# Patient Record
Sex: MALE | Race: WHITE | NOT HISPANIC OR LATINO | URBAN - METROPOLITAN AREA
[De-identification: names, ages, dates, MRNs, and addresses within clinical notes are randomized per-mention and may not be internally consistent; named-entity substitution may affect disease eponyms.]

---

## 2019-03-05 NOTE — ASU PATIENT PROFILE, ADULT - NSTRANFUSIONPLAN_GEN_ALL_CORE_SIUH
Hpi Title: Evaluation of Skin Lesions
How Severe Are Your Spot(S)?: mild
Have Your Spot(S) Been Treated In The Past?: has not been treated
not applicable

## 2019-03-06 ENCOUNTER — OUTPATIENT (OUTPATIENT)
Dept: OUTPATIENT SERVICES | Facility: HOSPITAL | Age: 29
LOS: 1 days | Discharge: HOME | End: 2019-03-06

## 2019-03-06 VITALS
HEART RATE: 75 BPM | DIASTOLIC BLOOD PRESSURE: 68 MMHG | SYSTOLIC BLOOD PRESSURE: 113 MMHG | RESPIRATION RATE: 14 BRPM | OXYGEN SATURATION: 96 %

## 2019-03-06 VITALS
TEMPERATURE: 98 F | RESPIRATION RATE: 18 BRPM | OXYGEN SATURATION: 98 % | HEIGHT: 74 IN | WEIGHT: 214.95 LBS | HEART RATE: 73 BPM | SYSTOLIC BLOOD PRESSURE: 139 MMHG | DIASTOLIC BLOOD PRESSURE: 86 MMHG

## 2019-03-06 DIAGNOSIS — Z90.89 ACQUIRED ABSENCE OF OTHER ORGANS: Chronic | ICD-10-CM

## 2019-03-06 RX ORDER — OXYCODONE AND ACETAMINOPHEN 5; 325 MG/1; MG/1
1 TABLET ORAL ONCE
Qty: 0 | Refills: 0 | Status: DISCONTINUED | OUTPATIENT
Start: 2019-03-06 | End: 2019-03-06

## 2019-03-06 RX ORDER — HYDROMORPHONE HYDROCHLORIDE 2 MG/ML
0.5 INJECTION INTRAMUSCULAR; INTRAVENOUS; SUBCUTANEOUS
Qty: 0 | Refills: 0 | Status: DISCONTINUED | OUTPATIENT
Start: 2019-03-06 | End: 2019-03-06

## 2019-03-06 RX ORDER — ONDANSETRON 8 MG/1
4 TABLET, FILM COATED ORAL ONCE
Qty: 0 | Refills: 0 | Status: DISCONTINUED | OUTPATIENT
Start: 2019-03-06 | End: 2019-03-21

## 2019-03-06 RX ORDER — SODIUM CHLORIDE 9 MG/ML
1000 INJECTION, SOLUTION INTRAVENOUS
Qty: 0 | Refills: 0 | Status: DISCONTINUED | OUTPATIENT
Start: 2019-03-06 | End: 2019-03-21

## 2019-03-06 RX ADMIN — SODIUM CHLORIDE 100 MILLILITER(S): 9 INJECTION, SOLUTION INTRAVENOUS at 13:08

## 2019-03-06 RX ADMIN — HYDROMORPHONE HYDROCHLORIDE 0.5 MILLIGRAM(S): 2 INJECTION INTRAMUSCULAR; INTRAVENOUS; SUBCUTANEOUS at 13:46

## 2019-03-06 RX ADMIN — HYDROMORPHONE HYDROCHLORIDE 0.5 MILLIGRAM(S): 2 INJECTION INTRAMUSCULAR; INTRAVENOUS; SUBCUTANEOUS at 13:34

## 2019-03-06 RX ADMIN — OXYCODONE AND ACETAMINOPHEN 1 TABLET(S): 5; 325 TABLET ORAL at 14:18

## 2019-03-06 RX ADMIN — HYDROMORPHONE HYDROCHLORIDE 0.5 MILLIGRAM(S): 2 INJECTION INTRAMUSCULAR; INTRAVENOUS; SUBCUTANEOUS at 13:12

## 2019-03-06 RX ADMIN — HYDROMORPHONE HYDROCHLORIDE 0.5 MILLIGRAM(S): 2 INJECTION INTRAMUSCULAR; INTRAVENOUS; SUBCUTANEOUS at 13:22

## 2019-03-06 NOTE — PRE-ANESTHESIA EVALUATION ADULT - WEIGHT IN LBS
FFPS FINISHED. PATIENT VS STABLE WITH NO COMPLAINTS. IV INTACT. WILL CONTINUE
TO MONITOR. CALL LIGHT WITHINR EACH. [phone]

## 2019-03-06 NOTE — H&P PST ADULT - ATTENDING COMMENTS
r/b/a explained to patient again  explained risk of neuropraxia (LABC, radial, median, pin nerve)  consent reviewed  all questions answered

## 2019-03-06 NOTE — CHART NOTE - NSCHARTNOTEFT_GEN_A_CORE
PACU ANESTHESIA ADMISSION NOTE      Procedure:   Post op diagnosis:      ____  Intubated  TV:______       Rate: ______      FiO2: ______    __x__  Patent Airway    ___x_  Full return of protective reflexes    ___x_  Full recovery from anesthesia / back to baseline     Vitals:   T:  97.9         R:  18                BP:    113/69              Sat:    96%               P: 69      Mental Status:  _x___ Awake   _____ Alert   _____ Drowsy   _____ Sedated    Nausea/Vomiting:  __x__ NO  ______Yes,   See Post - Op Orders          Pain Scale (0-10):  _____    Treatment: __x__ None    ____ See Post - Op/PCA Orders    Post - Operative Fluids:   ____ Oral   x____ See Post - Op Orders    Plan: Discharge:   __x__Home       _____Floor     _____Critical Care    _____  Other:_________________    Comments:

## 2019-03-06 NOTE — ASU DISCHARGE PLAN (ADULT/PEDIATRIC). - SPECIAL INSTRUCTIONS
Post Operative Instructions for Elbow Surgery    Your Surgery Included  [x ] Distal biceps repair  	  Call our office ([phone]) immediately if you experience any of the following:  •	Excessive bleeding or pus like drainage at the incision site  •	Uncontrollable pain not relieved by pain medication  •	Excessive swelling or redness at the incision site  •	Fever above 101.5 degrees not controlled with Tylenol or Motrin  •	Shortness of Breath  •	Any foul odor or blistering from the surgery site    Pain Management: You were given one or more of the following medication prescriptions before leaving the hospital. Have the prescriptions filled at a pharmacy on your way home and follow the instructions on the bottles.   Regional Anesthesia Injections (Blocks): You may have been given a regional nerve block either before or after surgery. This may numb your shoulder for 24-36 hours    Diet: Eat a bland diet for the first day after surgery. Progress your diet as tolerated. Constipation may occur with Narcotic usage, contact our office if you are experiencing constipation.    Activity: After you arrive at home, spend most of the first 24 hours resting in bed, on the couch, or in a reclining chair. After the first 24 hours at home, slowly increase your activity level based on your symptoms.    Dressing Change: Stay in splint and dressing until follow up.    Showering: Keep operative extremity clean and dry.    Shoulder Sling: You may have been sent home with a sling / pillow attachment holding your arm away from your body. You may remove the sling when changing clothes or bathing. Make sure to wear the sling while sleeping unless instructed otherwise. You may remove for exercises.    Shoulder Exercises: You may do these exercises for 2-5 mins five times a day in order to help regain your range of motion.  [x ] Shoulder Shrugs: Shrug your shoulders up and down  [ ] Pendulums: Bend forward allowing your arm to hang down in front of you. Gently swing your arm side-to-side and front to back  [ ] Elbow range of motion: Straighten and bend your elbow  [x ] Scapula Retractions: Squeeze shoulder blades together while slightly pulling them down  [ ] Passive Abduction: Have family member lift your arm away from your body bringing your elbow to the level of your shoulder  [ ] Shoulder rotation: With your arm at your side, have a family member rotate your arm internally and externally  [ ] Pulley exercises: Put a towel over the top of a door and face the door, use your good arm to pull your arm up in front of you    Follow Up: As Scheduled

## 2019-03-06 NOTE — ASU PREOP CHECKLIST - WEIGHT IN KG
The following medication was given:     MEDICATION: Vitamin B12  1,000mcg  ROUTE: IM  SITE: Deltoid - Left  LOT #: [id number]  :  [employer]  EXPIRATION DATE:  9/2018  NDC#: 8756-7117-08    Aneta Dueñas MA       
97.5
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
## 2019-03-20 DIAGNOSIS — S46.112A STRAIN OF MUSCLE, FASCIA AND TENDON OF LONG HEAD OF BICEPS, LEFT ARM, INITIAL ENCOUNTER: ICD-10-CM

## 2019-08-21 ENCOUNTER — EMERGENCY (EMERGENCY)
Facility: HOSPITAL | Age: 29
LOS: 0 days | Discharge: HOME | End: 2019-08-21
Attending: EMERGENCY MEDICINE | Admitting: EMERGENCY MEDICINE
Payer: COMMERCIAL

## 2019-08-21 VITALS
WEIGHT: 225.09 LBS | TEMPERATURE: 98 F | OXYGEN SATURATION: 98 % | RESPIRATION RATE: 18 BRPM | HEART RATE: 76 BPM | SYSTOLIC BLOOD PRESSURE: 175 MMHG | DIASTOLIC BLOOD PRESSURE: 81 MMHG

## 2019-08-21 DIAGNOSIS — Z90.89 ACQUIRED ABSENCE OF OTHER ORGANS: Chronic | ICD-10-CM

## 2019-08-21 DIAGNOSIS — M54.5 LOW BACK PAIN: ICD-10-CM

## 2019-08-21 DIAGNOSIS — M62.838 OTHER MUSCLE SPASM: ICD-10-CM

## 2019-08-21 DIAGNOSIS — M79.669 PAIN IN UNSPECIFIED LOWER LEG: ICD-10-CM

## 2019-08-21 PROCEDURE — 99283 EMERGENCY DEPT VISIT LOW MDM: CPT

## 2019-08-21 RX ORDER — KETOROLAC TROMETHAMINE 30 MG/ML
15 SYRINGE (ML) INJECTION ONCE
Refills: 0 | Status: DISCONTINUED | OUTPATIENT
Start: 2019-08-21 | End: 2019-08-21

## 2019-08-21 RX ORDER — METHOCARBAMOL 500 MG/1
1500 TABLET, FILM COATED ORAL ONCE
Refills: 0 | Status: COMPLETED | OUTPATIENT
Start: 2019-08-21 | End: 2019-08-21

## 2019-08-21 RX ORDER — METHOCARBAMOL 500 MG/1
2 TABLET, FILM COATED ORAL
Qty: 40 | Refills: 0
Start: 2019-08-21 | End: 2019-08-25

## 2019-08-21 RX ADMIN — METHOCARBAMOL 1500 MILLIGRAM(S): 500 TABLET, FILM COATED ORAL at 22:42

## 2019-08-21 RX ADMIN — Medication 15 MILLIGRAM(S): at 22:42

## 2019-08-21 NOTE — ED PROVIDER NOTE - PHYSICAL EXAMINATION
PHYSICAL EXAM: I have reviewed current vital signs.  GENERAL: NAD, well-nourished; well-developed.  HEAD:  Normocephalic, atraumatic.  EYES: Conjunctiva and sclera clear.  ENT: MMM, no erythema/exudates.  NECK: Supple, full ROM, no midline TTP.  CHEST/LUNG: Clear to auscultation bilaterally; no wheezes, rales, or rhonchi.  HEART: Regular rate and rhythm, normal S1 and S2; no murmurs, rubs, or gallops.  ABDOMEN: Soft, nontender, nondistended.  EXTREMITIES:  2+ peripheral pulses; FROM of all extremities.  MSK:  No midline TTP, negative straight leg raise, atraumatic.  NEUROLOGY: A&O x 3. Motor 5/5. No focal neurological deficits.   SKIN: Warm and dry.

## 2019-08-21 NOTE — ED ADULT TRIAGE NOTE - CHIEF COMPLAINT QUOTE
c/o b/l hamstring pain shooting into calves after decompression therapy at chiropractor on Monday. States he originally went to chiropractor for sciatic nerve pain Patient seen running in ED from chair to room and watched patient try to climb onto stretcher.

## 2019-08-21 NOTE — ED PROVIDER NOTE - ATTENDING CONTRIBUTION TO CARE
27 yo M pmh of sciatica presents with bilateral hamstring pain left worse than right. States that last week he started to have lower back pain and decided to go to a chiropractor few days ago and had decompression done. States that he did well after but then started to have bilateral lower back pain radiating down the back of his legs. no numbness, tingling or weakness. no trauma, walking normally. no changes in urination or bowel habits.     CONSTITUTIONAL: Well-developed; well-nourished; in no acute distress.   SKIN: warm, dry  HEAD: Normocephalic; atraumatic.  EYES: PERRL, EOMI, no conjunctival erythema  ENT: No nasal discharge; airway clear.  NECK: Supple; non tender.  CARD: S1, S2 normal;  Regular rate and rhythm.   RESP: No wheezes, rales or rhonchi.  ABD: soft non tender, non distended, no rebound or guarding  EXT: Normal ROM.  no midline spinal tenderness. 5/5 strength in all 4 extremities. + LLE straight leg raise test.   LYMPH: No acute cervical adenopathy.  NEURO: Alert, oriented, grossly unremarkable. neurovascularly intact

## 2019-08-21 NOTE — ED ADULT NURSE NOTE - NSIMPLEMENTINTERV_GEN_ALL_ED
Implemented All Universal Safety Interventions:  [place] to call system. Call bell, personal items and telephone within reach. Instruct patient to call for assistance. Room bathroom lighting operational. Non-slip footwear when patient is off stretcher. Physically safe environment: no spills, clutter or unnecessary equipment. Stretcher in lowest position, wheels locked, appropriate side rails in place.

## 2019-08-21 NOTE — ED ADULT NURSE NOTE - CHIEF COMPLAINT QUOTE
c/o b/l hamstring pain shooting into calves after decompression therapy at chiropractor on Monday. States he originally went to chiropractor for sciatic nerve pain

## 2019-08-21 NOTE — ED PROVIDER NOTE - CLINICAL SUMMARY MEDICAL DECISION MAKING FREE TEXT BOX
Patient presents with bilateral hamstring pain. toradol and robaxin given with improvement in symptoms. Discharged with motrin and rehab follow up. Return precautions discussed.

## 2019-08-21 NOTE — ED PROVIDER NOTE - PROGRESS NOTE DETAILS
Will give muscle relaxant and Toradol, then reassess. Pain improved after meds. Strict return precaution signs/sxs given.

## 2019-08-21 NOTE — ED PROVIDER NOTE - NSFOLLOWUPINSTRUCTIONS_ED_ALL_ED_FT
Please follow up with your primary care physician in 1-2 days.       Muscle Cramps and Spasms  ImageMuscle cramps and spasms occur when a muscle or muscles tighten and you have no control over this tightening (involuntary muscle contraction). They are a common problem and can develop in any muscle. The most common place is in the calf muscles of the leg. Muscle cramps and muscle spasms are both involuntary muscle contractions, but there are some differences between the two:  Muscle cramps are painful. They come and go and may last a few seconds to 15 minutes. Muscle cramps are often more forceful and last longer than muscle spasms.  Muscle spasms may or may not be painful. They may also last just a few seconds or much longer.  Certain medical conditions, such as diabetes or Parkinson disease, can make it more likely to develop cramps or spasms. However, cramps or spasms are usually not caused by a serious underlying problem. Common causes include:  Overexertion.  Overuse from repetitive motions, or doing the same thing over and over.  Remaining in a certain position for a long period of time.  Improper preparation, form, or technique while playing a sport or doing an activity.  Dehydration.  Injury.  Side effects of some medicines.  Abnormally low levels of the salts and ions in your blood (electrolytes), especially potassium and calcium. This could happen if you are taking water pills (diuretics) or if you are pregnant.  In many cases, the cause of muscle cramps or spasms is unknown.    Follow these instructions at home:  Stay well hydrated. Drink enough fluid to keep your urine clear or pale yellow.  Try massaging, stretching, and relaxing the affected muscle.  If directed, apply heat to tight or tense muscles as often as told by your health care provider. Use the heat source that your health care provider recommends, such as a moist heat pack or a heating pad.  Place a towel between your skin and the heat source.  Leave the heat on for 20–30 minutes.  Remove the heat if your skin turns bright red. This is especially important if you are unable to feel pain, heat, or cold. You may have a greater risk of getting burned.  If directed, put ice on the affected area. This may help if you are sore or have pain after a cramp or spasm.  Put ice in a plastic bag.  Place a towel between your skin and the bag.  Leave the ice on for 20 minutes, 2–3 times a day.  Take over-the-counter and prescription medicines only as told by your health care provider.  Pay attention to any changes in your symptoms.  Contact a health care provider if:  Your cramps or spasms get more severe or happen more often.  Your cramps or spasms do not improve over time.  This information is not intended to replace advice given to you by your health care provider. Make sure you discuss any questions you have with your health care provider. Please follow up with your primary care physician in 1-2 days.       Muscle Cramps and Spasms  Muscle cramps and spasms occur when a muscle or muscles tighten and you have no control over this tightening (involuntary muscle contraction). They are a common problem and can develop in any muscle. The most common place is in the calf muscles of the leg. Muscle cramps and muscle spasms are both involuntary muscle contractions, but there are some differences between the two:  Muscle cramps are painful. They come and go and may last a few seconds to 15 minutes. Muscle cramps are often more forceful and last longer than muscle spasms.  Muscle spasms may or may not be painful. They may also last just a few seconds or much longer.  Certain medical conditions, such as diabetes or Parkinson disease, can make it more likely to develop cramps or spasms. However, cramps or spasms are usually not caused by a serious underlying problem. Common causes include:  Overexertion.  Overuse from repetitive motions, or doing the same thing over and over.  Remaining in a certain position for a long period of time.  Improper preparation, form, or technique while playing a sport or doing an activity.  Dehydration.  Injury.  Side effects of some medicines.  Abnormally low levels of the salts and ions in your blood (electrolytes), especially potassium and calcium. This could happen if you are taking water pills (diuretics) or if you are pregnant.  In many cases, the cause of muscle cramps or spasms is unknown.    Follow these instructions at home:  Stay well hydrated. Drink enough fluid to keep your urine clear or pale yellow.  Try massaging, stretching, and relaxing the affected muscle.  If directed, apply heat to tight or tense muscles as often as told by your health care provider. Use the heat source that your health care provider recommends, such as a moist heat pack or a heating pad.  Place a towel between your skin and the heat source.  Leave the heat on for 20–30 minutes.  Remove the heat if your skin turns bright red. This is especially important if you are unable to feel pain, heat, or cold. You may have a greater risk of getting burned.  If directed, put ice on the affected area. This may help if you are sore or have pain after a cramp or spasm.  Put ice in a plastic bag.  Place a towel between your skin and the bag.  Leave the ice on for 20 minutes, 2–3 times a day.  Take over-the-counter and prescription medicines only as told by your health care provider.  Pay attention to any changes in your symptoms.  Contact a health care provider if:  Your cramps or spasms get more severe or happen more often.  Your cramps or spasms do not improve over time.  This information is not intended to replace advice given to you by your health care provider. Make sure you discuss any questions you have with your health care provider.

## 2019-08-21 NOTE — ED PROVIDER NOTE - NS ED ROS FT
Constitutional:  No fevers or chills.  Eyes:  No visual changes, eye pain, or discharge.  ENT:  No hearing changes. No sore throat.  Neck:  No neck pain.  Cardiac:  No CP or edema.  Resp:  No cough or SOB.   GI:  No nausea, vomiting, diarrhea, or abdominal pain.  :  No dysuria, frequency, hematuria, or incontinence.  MSK:  No myalgias. +Low back pain/sciatica pain.  Neuro:  No headache, dizziness, or weakness.  Skin:  No skin rash.

## 2019-08-21 NOTE — ED PROVIDER NOTE - OBJECTIVE STATEMENT
29yo M with PMH of sciatica and herniated discs presenting with sciatica pain down both legs (greater on left), shooting pains, and mild low lumbar pain (now improved). Patient reports he developed low back pain on about 4-5 days ago, did do some lifting but nothing that was increased in heaviness. Saw chiropractor and had decompression therapy 3 days ago which improved the low back pain but is still having sharp shooting pains down legs. Denies any f/c, CP, SOB, abd pain, urinary sxs, direct injuries/trauma to area, or urinary/fecal incontinence. Has been taking 4-5 200mg Aleves every 8 to 10 hours, last took at noon today.

## 2019-08-21 NOTE — ED PROVIDER NOTE - NSFOLLOWUPCLINICS_GEN_ALL_ED_FT
[facility] ([facility])  Rehabilitation  [facility] [address] [place] [address]  Phone: [phone]  Fax:   Follow Up Time:

## 2019-08-24 ENCOUNTER — EMERGENCY (EMERGENCY)
Facility: HOSPITAL | Age: 29
LOS: 0 days | Discharge: HOME | End: 2019-08-24
Attending: EMERGENCY MEDICINE | Admitting: EMERGENCY MEDICINE
Payer: COMMERCIAL

## 2019-08-24 VITALS
TEMPERATURE: 97 F | OXYGEN SATURATION: 97 % | SYSTOLIC BLOOD PRESSURE: 139 MMHG | DIASTOLIC BLOOD PRESSURE: 85 MMHG | HEART RATE: 82 BPM | RESPIRATION RATE: 17 BRPM | WEIGHT: 225.09 LBS
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
## 2019-08-24 DIAGNOSIS — Z90.89 ACQUIRED ABSENCE OF OTHER ORGANS: Chronic | ICD-10-CM
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
## 2019-08-24 DIAGNOSIS — Z98.890 OTHER SPECIFIED POSTPROCEDURAL STATES: ICD-10-CM

## 2019-08-24 DIAGNOSIS — M79.669 PAIN IN UNSPECIFIED LOWER LEG: ICD-10-CM

## 2019-08-24 DIAGNOSIS — S76.312A STRAIN OF MUSCLE, FASCIA AND TENDON OF THE POSTERIOR MUSCLE GROUP AT THIGH LEVEL, LEFT THIGH, INITIAL ENCOUNTER: ICD-10-CM

## 2019-08-24 PROCEDURE — 99282 EMERGENCY DEPT VISIT SF MDM: CPT

## 2019-08-24 NOTE — ED PROVIDER NOTE - OBJECTIVE STATEMENT
28 y.o male w/ hx of chronic low back pain, sciatica presents to the ED for evaluation of left posterior thigh pain x 1 days.  3 days ago stood up quickly while he was in a rush, felt acute pain of left posterior thigh and low back. Was seen in ED 3 days ago for back pain, given muscle relaxers.  F/u with chiropractor, had decompression therapy with improvement of back pain. Since then pain of posterior thigh. Pain improved with cold and hot packs.  Seen by ortho yesterday and had negative xrays, given meloxicam with relief of pain.  Today posterior thigh pain worse prompting visit to the ED.  Worse w/ stretching hamstring, alleviated with rest, mild severity, no radiation of pain. Denies back pain, fever, chills, hx of IVDA, saddle anesthesia, bowel/bladder incontinence/ retention, weakness of lower extremities

## 2019-08-24 NOTE — ED PROVIDER NOTE - PHYSICAL EXAMINATION
CONST: Well appearing in NAD  EYES: Sclera and conjunctiva clear.  CARD: Normal S1 S2; Normal rate and rhythm  RESP: Equal BS B/L, No wheezes, rhonchi or rales. No distress  GI: Soft, non-tender, non-distended.  MS: no TTP of midline C/T/L tenderness, no lumbar paravertebral tenderness, Normal ROM in all extremities, no TTP of left posterior thigh, pain worse with straightening left leg, No edema of lower extremities, no calf pain, posterior tibialis pulses 2+ bilaterally  SKIN: Warm, dry, no acute rashes. Good turgor  NEURO: left leg NVI, Strength 5/5 with no sensory deficits. Steady gait

## 2019-08-24 NOTE — ED PROVIDER NOTE - ATTENDING CONTRIBUTION TO CARE
28 y M no PMH pw left posterior thigh pain, originated with a rapid movement going from leaning back in a chair with his legs up to seated. somewhat improved by cold and hot packs, chiropractic adjustment, and meloxicam rx outpt, worsened with stretch. no buttock pain or back pain. No trauma to suspect fracture, no recent back surgery, history of easy bleeding or bruising, chronic illness, steroids, IVDU, fevers, weakness, loss of urine or bowel control, numbness, or inability to ambulate.  Exam: NAD, NCAT, HEENT: mmm, EOMI, PERRLA, Neck: supple, nontender, nl ROM, Heart: RRR, no murmur, Lungs: BCTA, no signs of increased WOB, Abd: NTND, no guarding or rebound, no hernia palpated, no CVAT. MSK: left lateral hamstring, ttp, no bunching to suggest complete tear. Chest, back, and ext nontender, nl rom, no deformity, no edema. Neuro: A&Ox3, normal strength, nl sensation throughout, normal speech and gait  A/P: Likely hamstring pull, No sign of other pathology, pain control, home therapy.

## 2019-08-24 NOTE — ED PROVIDER NOTE - CARE PROVIDER_API CALL
Jori White (MD)  Orthopaedic Surgery  [address] [place] [address]  Phone: [phone]  Fax: [phone]  Follow Up Time: 1-3 Days

## 2019-08-24 NOTE — ED PROVIDER NOTE - CLINICAL SUMMARY MEDICAL DECISION MAKING FREE TEXT BOX
pw left posterior thigh pain, likely muscle strain. Patient to be discharged from ED. Any available test results were discussed with patient and/or family. Verbal instructions given, including instructions to return to ED immediately for any new, worsening, or concerning symptoms. Patient endorsed understanding. Written discharge instructions additionally given, including follow-up plan.

## 2019-08-24 NOTE — ED PROVIDER NOTE - NSFOLLOWUPINSTRUCTIONS_ED_ALL_ED_FT
Strain    A strain is a stretch or tear in one of the muscles in your body. This is caused by an injury to the area such as a twisting mechanism. Symptoms include pain, swelling, or bruising. Rest that area over the next several days and slowly resume activity when tolerated. Ice can help with swelling and pain.     SEEK IMMEDIATE MEDICAL CARE IF YOU HAVE ANY OF THE FOLLOWING SYMPTOMS: worsening pain, inability to move that body part, numbness or tingling.    Hamstring Strain  Image   A hamstring strain happens when the muscles in the back of the thighs (hamstring muscles) are overstretched or torn. The hamstring muscles are used in straightening the hips, bending the knees, and pulling back the legs. This injury is often called a pulled hamstring muscle. The tissue that connects the muscle to a bone (tendon) may also be affected.    The severity of a hamstring strain may be rated in degrees or grades. First-degree (or grade 1) strains have the least amount of muscle tearing and pain. Second-degree and third-degree (grade 2 and 3) strains have increasingly more tearing and pain.    What are the causes?  This condition is caused by a sudden, violent force being placed on the hamstring muscles, stretching them too far. This often happens during activities that involve running, jumping, kicking, or weight lifting.    What increases the risk?  Hamstring strains are especially common in athletes. The following factors may also make you more likely to develop this condition:  Having low strength, endurance, or flexibility of the hamstring muscles.  Doing high-impact physical activity or sports.  Having poor physical fitness.  Having a previous leg injury.  Having tired (fatigued) muscles.  What are the signs or symptoms?  Symptoms of this condition include:  Pain in the back of the thigh.  Swelling.  Bruising.  Muscle spasms.  Trouble moving the affected muscle because of pain.  For severe strains, you may feel popping or snapping in the back of your thigh when the injury occurs.    How is this diagnosed?  This condition is diagnosed based on your symptoms, your medical history, and a physical exam.    How is this treated?  Treatment for this condition usually involves:  Protecting, resting, icing, applying compression, and elevating the injured area (PRICE therapy).  Medicines. Your health care provider may recommend medicines to help reduce pain or inflammation.  Doing exercises to regain strength and flexibility in the muscles. Your health care provider will tell you when it is okay to begin exercising.  Follow these instructions at home:  PRICE therapy     ImageUse PRICE therapy to promote muscle healing during the first 2–3 days after your injury, or as told by your health care provider.  Protect the muscle from being injured again.  Rest your injury. This usually involves limiting your normal activities and not using the injured hamstring muscle. Talk with your health care provider about how you should limit your activities.  Apply ice to the injured area:  Put ice in a plastic bag.   Place a towel between your skin and the bag.   Leave the ice on for 20 minutes, 2–3 times a day. After the third day, switch to applying heat as told.  Put pressure (compression) on your injured hamstring by wrapping it with an elastic bandage. Be careful not to wrap it too tightly. That may interfere with blood circulation or may increase swelling.  Raise (elevate) your injured hamstring above the level of your heart as often as possible. When you are lying down, you can do this by putting a pillow under your thigh.  Activity     Begin exercising or stretching only as told by your health care provider.  Do not return to full activity level until your health care provider approves.  To help prevent muscle strains in the future, always warm up before exercising and stretch afterward.  General instructions     Take over-the-counter and prescription medicines only as told by your health care provider.  If directed, apply heat to the affected area as often as told by your health care provider. Use the heat source that your health care provider recommends, such as a moist heat pack or a heating pad.  Place a towel between your skin and the heat source.   Leave the heat on for 20–30 minutes.   Remove the heat if your skin turns bright red. This is especially important if you are unable to feel pain, heat, or cold. You may have a greater risk of getting burned.  Keep all follow-up visits as told by your health care provider. This is important.  Contact a health care provider if you have:  Increasing pain or swelling in the injured area.  Numbness, tingling, or a significant loss of strength in the injured area.  Get help right away if:  Your foot or your toes become cold or turn blue.  Summary  A hamstring strain happens when the muscles in the back of the thighs (hamstring muscles) are overstretched or torn.  This injury can be caused by a sudden, violent force being placed on the hamstring muscles, causing them to stretch too far.  Symptoms include pain, swelling, and muscle spasms in the injured area.  Treatment includes what is called PRICE therapy: protecting, resting, icing, applying compression, and elevating the injured area.  This information is not intended to replace advice given to you by your health care provider. Make sure you discuss any questions you have with your health care provider.    Follow up with your primary medical doctor in 1-2 days

## 2019-08-24 NOTE — ED PROVIDER NOTE - NS ED ROS FT
Constitutional: See HPI.  Eyes: No visual changes, eye pain or discharge.   ENMT: No hearing changes, pain, discharge or infections.   Cardiac: No SOB or edema. No chest pain with exertion.  Respiratory: No cough or respiratory distress.   GI: No nausea, vomiting, diarrhea or abdominal pain.  : No dysuria, frequency or burning. No Discharge  MS: + posterior thigh pain. No myalgia, muscle weakness, joint pain or back pain.  Neuro: No headache or weakness.   Skin: No skin rash.  Except as documented in the HPI, all other systems are negative.

## 2021-01-06 NOTE — ED ADULT NURSE NOTE - NS PRO PASSIVE SMOKE EXP
No
[facility]  [id number] [place] [address]  Phone: [phone]  Fax: [phone]  Follow Up Time: 1-3 Days

## 2021-02-11 NOTE — ASU PATIENT PROFILE, ADULT - URINARY CATHETER
no Detail Level: Zone Note Text (......Xxx Chief Complaint.): This diagnosis correlates with the Other (Free Text): Hold effudex for now Render Risk Assessment In Note?: no

## 2021-03-18 NOTE — ED PROVIDER NOTE - NS ED ATTENDING STATEMENT MOD
PRINCIPAL DISCHARGE DIAGNOSIS  Diagnosis: Frequent falls  Assessment and Plan of Treatment: Seen by Neurology.  Pt has hx of parkinsons.  Continue current medications.  Physical therapy recommending ROLA.      SECONDARY DISCHARGE DIAGNOSES  Diagnosis: Abrasions of multiple sites  Assessment and Plan of Treatment: Bilateral upper extremity skin tears: Current orders to use bacitracin BID.     I have personally seen and examined this patient.  I have fully participated in the care of this patient. I have reviewed all pertinent clinical information, including history, physical exam, plan and the Resident’s note and agree except as noted.

## 2024-07-12 NOTE — PRE-ANESTHESIA EVALUATION ADULT - TEMPERATURE IN CELSIUS (DEGREES C)
7/12/24, 3:02 PM EDT    DISCHARGE PLANNING EVALUATION    Spoke with [facility] admissions, facility has declined for admission to snf.     36.6